# Patient Record
Sex: FEMALE | Race: ASIAN | NOT HISPANIC OR LATINO | URBAN - NONMETROPOLITAN AREA
[De-identification: names, ages, dates, MRNs, and addresses within clinical notes are randomized per-mention and may not be internally consistent; named-entity substitution may affect disease eponyms.]

---

## 2023-04-12 ENCOUNTER — OFFICE VISIT (OUTPATIENT)
Dept: URBAN - NONMETROPOLITAN AREA CLINIC 4 | Facility: CLINIC | Age: 33
End: 2023-04-12

## 2023-05-18 ENCOUNTER — OFFICE VISIT (OUTPATIENT)
Dept: URBAN - NONMETROPOLITAN AREA CLINIC 4 | Facility: CLINIC | Age: 33
End: 2023-05-18

## 2023-08-18 ENCOUNTER — OFFICE VISIT (OUTPATIENT)
Dept: URBAN - NONMETROPOLITAN AREA CLINIC 4 | Facility: CLINIC | Age: 33
End: 2023-08-18

## 2023-08-31 ENCOUNTER — LAB OUTSIDE AN ENCOUNTER (OUTPATIENT)
Dept: URBAN - NONMETROPOLITAN AREA CLINIC 4 | Facility: CLINIC | Age: 33
End: 2023-08-31

## 2023-08-31 ENCOUNTER — WEB ENCOUNTER (OUTPATIENT)
Dept: URBAN - NONMETROPOLITAN AREA CLINIC 4 | Facility: CLINIC | Age: 33
End: 2023-08-31

## 2023-08-31 ENCOUNTER — DASHBOARD ENCOUNTERS (OUTPATIENT)
Age: 33
End: 2023-08-31

## 2023-08-31 ENCOUNTER — OFFICE VISIT (OUTPATIENT)
Dept: URBAN - NONMETROPOLITAN AREA CLINIC 4 | Facility: CLINIC | Age: 33
End: 2023-08-31
Payer: COMMERCIAL

## 2023-08-31 VITALS
TEMPERATURE: 97.7 F | WEIGHT: 148 LBS | DIASTOLIC BLOOD PRESSURE: 80 MMHG | HEIGHT: 65 IN | BODY MASS INDEX: 24.66 KG/M2 | SYSTOLIC BLOOD PRESSURE: 110 MMHG | HEART RATE: 77 BPM

## 2023-08-31 DIAGNOSIS — K21.9 GASTROESOPHAGEAL REFLUX DISEASE, UNSPECIFIED WHETHER ESOPHAGITIS PRESENT: ICD-10-CM

## 2023-08-31 DIAGNOSIS — Z80.0 FAMILY HISTORY OF COLON CANCER: ICD-10-CM

## 2023-08-31 DIAGNOSIS — R14.0 ABDOMINAL BLOATING: ICD-10-CM

## 2023-08-31 DIAGNOSIS — R19.4 ALTERED BOWEL HABITS: ICD-10-CM

## 2023-08-31 DIAGNOSIS — R13.10 PILL DYSPHAGIA: ICD-10-CM

## 2023-08-31 DIAGNOSIS — Z87.19 HISTORY OF IBS: ICD-10-CM

## 2023-08-31 PROBLEM — 429699009: Status: ACTIVE | Noted: 2023-08-31

## 2023-08-31 PROBLEM — 235595009: Status: ACTIVE | Noted: 2023-08-31

## 2023-08-31 PROBLEM — 399122003: Status: ACTIVE | Noted: 2023-08-31

## 2023-08-31 PROCEDURE — 99204 OFFICE O/P NEW MOD 45 MIN: CPT | Performed by: REGISTERED NURSE

## 2023-08-31 RX ORDER — SODIUM, POTASSIUM,MAG SULFATES 17.5-3.13G
AS DIRECTED SOLUTION, RECONSTITUTED, ORAL ORAL
Qty: 1 | Refills: 0 | OUTPATIENT
Start: 2023-08-31 | End: 2023-09-02

## 2023-08-31 RX ORDER — RISPERIDONE 1 MG/1
1 TABLET TABLET ORAL ONCE A DAY
Status: ACTIVE | COMMUNITY

## 2023-08-31 RX ORDER — HYDROXYZINE HYDROCHLORIDE 10 MG/1
1 TABLET AS NEEDED TABLET, FILM COATED ORAL ONCE A DAY
Status: ACTIVE | COMMUNITY

## 2023-08-31 NOTE — HPI-TODAY'S VISIT:
8/31/23: Pt is a 32 yo female with PMH of GERD, IBS, PCOS, HLD, anxiety, depression who is self referred for evaluation of reflux and altered bowel habits.   Pt reports worsening reflux since stopping her PPI therapy (lansoprazole 30 mg daily) in March, which she had been taking for 10 years. On July 4th, she ate burgers and fries and had severe reflux, epigastric pain and nausea. Also reports pill dysphagia. She started taking omeprazole 40 mg daily in July and started famotidine 40 mg nightly 3 days ago. She reports alternating constipation and diarrhea for the past week. Typically has 1 loose BM in morning. Has mild abdominal bloating. Has been following low FODMAP diet and eliminated protein for past 3 weeks. Denies rectal bleeding or unintentional weight loss. Denies FHx of CRC, IBD or celiac. Has PTSD since Mother passed in 2012. Has never had EGD or colonoscopy.

## 2023-09-01 ENCOUNTER — TELEPHONE ENCOUNTER (OUTPATIENT)
Dept: URBAN - METROPOLITAN AREA CLINIC 54 | Facility: CLINIC | Age: 33
End: 2023-09-01

## 2023-09-01 LAB
(TTG) AB, IGA: <1
(TTG) AB, IGG: <1
ANTIGLIADIN ABS, IGA: 1.3
C-REACTIVE PROTEIN, QUANT: 3.3
GLIADIN (DEAMIDATED) AB (IGA): 1.3
GLIADIN (DEAMIDATED) AB (IGG): <1
IMMUNOGLOBULIN A: 42
T-TRANSGLUTAMINASE (TTG) IGA: <1